# Patient Record
Sex: FEMALE | Race: WHITE | NOT HISPANIC OR LATINO | Employment: UNEMPLOYED | URBAN - METROPOLITAN AREA
[De-identification: names, ages, dates, MRNs, and addresses within clinical notes are randomized per-mention and may not be internally consistent; named-entity substitution may affect disease eponyms.]

---

## 2017-01-05 ENCOUNTER — LAB VISIT (OUTPATIENT)
Dept: LAB | Facility: HOSPITAL | Age: 42
End: 2017-01-05
Attending: INTERNAL MEDICINE
Payer: OTHER GOVERNMENT

## 2017-01-05 DIAGNOSIS — Z84.0 FAMILY HISTORY OF PSORIATIC ARTHRITIS: ICD-10-CM

## 2017-01-05 DIAGNOSIS — M25.40 JOINT SWELLING: ICD-10-CM

## 2017-01-05 DIAGNOSIS — M25.50 JOINT PAIN: ICD-10-CM

## 2017-01-05 DIAGNOSIS — E72.11 HYPERHOMOCYSTEINEMIA: ICD-10-CM

## 2017-01-05 LAB
ALBUMIN SERPL BCP-MCNC: 4.2 G/DL
ALP SERPL-CCNC: 47 U/L
ALT SERPL W/O P-5'-P-CCNC: 10 U/L
ANION GAP SERPL CALC-SCNC: 6 MMOL/L
AST SERPL-CCNC: 16 U/L
BASOPHILS # BLD AUTO: 0.02 K/UL
BASOPHILS NFR BLD: 0.3 %
BILIRUB SERPL-MCNC: 0.3 MG/DL
BUN SERPL-MCNC: 9 MG/DL
CALCIUM SERPL-MCNC: 9.3 MG/DL
CHLORIDE SERPL-SCNC: 107 MMOL/L
CO2 SERPL-SCNC: 25 MMOL/L
CREAT SERPL-MCNC: 0.9 MG/DL
CRP SERPL-MCNC: 0.5 MG/L
DIFFERENTIAL METHOD: NORMAL
EOSINOPHIL # BLD AUTO: 0.1 K/UL
EOSINOPHIL NFR BLD: 2 %
ERYTHROCYTE [DISTWIDTH] IN BLOOD BY AUTOMATED COUNT: 12.1 %
ERYTHROCYTE [SEDIMENTATION RATE] IN BLOOD BY WESTERGREN METHOD: 6 MM/HR
EST. GFR  (AFRICAN AMERICAN): >60 ML/MIN/1.73 M^2
EST. GFR  (NON AFRICAN AMERICAN): >60 ML/MIN/1.73 M^2
GLUCOSE SERPL-MCNC: 134 MG/DL
HCT VFR BLD AUTO: 38.2 %
HGB BLD-MCNC: 13 G/DL
LYMPHOCYTES # BLD AUTO: 2 K/UL
LYMPHOCYTES NFR BLD: 30.8 %
MCH RBC QN AUTO: 30.6 PG
MCHC RBC AUTO-ENTMCNC: 34 %
MCV RBC AUTO: 90 FL
MONOCYTES # BLD AUTO: 0.4 K/UL
MONOCYTES NFR BLD: 6.1 %
NEUTROPHILS # BLD AUTO: 3.9 K/UL
NEUTROPHILS NFR BLD: 60.8 %
PLATELET # BLD AUTO: 161 K/UL
PMV BLD AUTO: 10.3 FL
POTASSIUM SERPL-SCNC: 3.7 MMOL/L
PROT SERPL-MCNC: 6.8 G/DL
RBC # BLD AUTO: 4.25 M/UL
SODIUM SERPL-SCNC: 138 MMOL/L
WBC # BLD AUTO: 6.42 K/UL

## 2017-01-05 PROCEDURE — 36415 COLL VENOUS BLD VENIPUNCTURE: CPT

## 2017-01-05 PROCEDURE — 85025 COMPLETE CBC W/AUTO DIFF WBC: CPT

## 2017-01-05 PROCEDURE — 83090 ASSAY OF HOMOCYSTEINE: CPT

## 2017-01-05 PROCEDURE — 80053 COMPREHEN METABOLIC PANEL: CPT

## 2017-01-05 PROCEDURE — 86140 C-REACTIVE PROTEIN: CPT

## 2017-01-05 PROCEDURE — 85651 RBC SED RATE NONAUTOMATED: CPT

## 2017-01-06 LAB — HCYS SERPL-SCNC: 7.8 UMOL/L

## 2017-01-09 ENCOUNTER — OFFICE VISIT (OUTPATIENT)
Dept: UROLOGY | Facility: CLINIC | Age: 42
End: 2017-01-09
Payer: OTHER GOVERNMENT

## 2017-01-09 ENCOUNTER — HOSPITAL ENCOUNTER (OUTPATIENT)
Dept: RADIOLOGY | Facility: HOSPITAL | Age: 42
Discharge: HOME | End: 2017-01-09
Attending: UROLOGY
Payer: OTHER GOVERNMENT

## 2017-01-09 VITALS
RESPIRATION RATE: 16 BRPM | BODY MASS INDEX: 24.29 KG/M2 | HEART RATE: 70 BPM | SYSTOLIC BLOOD PRESSURE: 116 MMHG | DIASTOLIC BLOOD PRESSURE: 76 MMHG | HEIGHT: 62 IN | WEIGHT: 132 LBS

## 2017-01-09 DIAGNOSIS — N28.89 RENAL MASS: ICD-10-CM

## 2017-01-09 DIAGNOSIS — L95.8 URTICARIAL VASCULITIS: ICD-10-CM

## 2017-01-09 DIAGNOSIS — N30.01 ACUTE CYSTITIS WITH HEMATURIA: Primary | ICD-10-CM

## 2017-01-09 PROCEDURE — 25500020 PHARM REV CODE 255: Performed by: UROLOGY

## 2017-01-09 PROCEDURE — 99213 OFFICE O/P EST LOW 20 MIN: CPT | Mod: S$PBB,,, | Performed by: UROLOGY

## 2017-01-09 PROCEDURE — 74178 CT ABD&PLV WO CNTR FLWD CNTR: CPT | Mod: 26,,, | Performed by: RADIOLOGY

## 2017-01-09 PROCEDURE — 63600175 PHARM REV CODE 636 W HCPCS: Performed by: STUDENT IN AN ORGANIZED HEALTH CARE EDUCATION/TRAINING PROGRAM

## 2017-01-09 PROCEDURE — 99999 PR PBB SHADOW E&M-EST. PATIENT-LVL III: CPT | Mod: PBBFAC,,, | Performed by: UROLOGY

## 2017-01-09 PROCEDURE — 99213 OFFICE O/P EST LOW 20 MIN: CPT | Mod: PBBFAC | Performed by: UROLOGY

## 2017-01-09 PROCEDURE — 74178 CT ABD&PLV WO CNTR FLWD CNTR: CPT | Mod: TC

## 2017-01-09 RX ORDER — ZINC GLUCONATE 13.3 MG
LOZENGE ORAL
Qty: 180 TABLET | Refills: 1 | Status: SHIPPED | OUTPATIENT
Start: 2017-01-09 | End: 2017-01-10 | Stop reason: SDUPTHER

## 2017-01-09 RX ORDER — MELOXICAM 7.5 MG/1
7.5 TABLET ORAL DAILY PRN
Qty: 90 TABLET | Refills: 1 | Status: SHIPPED | OUTPATIENT
Start: 2017-01-09 | End: 2017-02-09 | Stop reason: SDUPTHER

## 2017-01-09 RX ORDER — DIPHENHYDRAMINE HYDROCHLORIDE 50 MG/ML
50 INJECTION INTRAMUSCULAR; INTRAVENOUS ONCE
Status: COMPLETED | OUTPATIENT
Start: 2017-01-09 | End: 2017-01-09

## 2017-01-09 RX ADMIN — IOHEXOL 75 ML: 350 INJECTION, SOLUTION INTRAVENOUS at 01:01

## 2017-01-09 RX ADMIN — DIPHENHYDRAMINE HYDROCHLORIDE 50 MG: 50 INJECTION INTRAMUSCULAR; INTRAVENOUS at 12:01

## 2017-01-09 NOTE — PATIENT INSTRUCTIONS
"  Bladder Infection, Female (Adult)    Urine is normally free from bacteria. But bacteria can get into the urinary tract from the skin around the rectum, or it can travel in the blood from elsewhere in the body. Once it is in your urinary tract, it can cause infection in the urethra (urethritis), the bladder (cystitis), or the kidneys (pyelonephritis).  The most common place for an infection is in the bladder. This is called a bladder infection. This is one of the most common infections in women. Most bladder infections are easily treated. They are not serious unless the infection spreads up to the kidney.  The phrases "bladder infection", "UTI," and "cystitis," are often used to describe the same thing, but they are not always the same. Cystitis is an inflammation of the bladder. The most common cause of cystitis is an infection.  Symptoms  The infection causes inflammation in the urethra and bladder, which causes many of the symptoms. The most common symptoms of a bladder infection are:  · Pain or burning when urinating  · Having to urinate more often than usual  · Urgent need to urinate  · Only a small amount of urine comes out  · Blood in urine  · Abdominal discomfort, usually in the lower abdomen, above the pubic bone  · Cloudy, strong, or bad smelling urine  · Urinary retention, being unable to urinate  · Unable to hold urine in (urinary incontinence)  · Fever  · Loss of appetite  · Confusion (in older adults)  Causes  Bladder infections are not contagious. You can't get one from someone else, from a toilet seat, or from sharing a bath.  The most common cause of bladder infections is bacteria from the bowels. The bacteria get onto the skin around the opening of the urethra. From there, it can get into the urine and travel up to the bladder, causing inflammation and infection. This usually happens because of:  · Wiping improperly after urinating--always wipe from front to back.  · Bowel " incontinence  · Pregnancy  · Procedures such as having a catheter inserted  · Older age  · Not emptying your bladder (stagnated urine gives bacteria a chance to grow)  · Dehydration  · Constipation  · Sex  · Use of a diaphragm for birth control   Treatment  Bladder infections are diagnosed by a urine test. They are treated with antibiotics and usually clear up quickly without complications. Treatment helps prevent a more serious kidney infection.  Medicines  Medicines can help in the treatment of a bladder infection:  · Take antibiotics until they are used up, even if you feel better. It is important to finish them to make sure the infection has cleared.  · You can use acetaminophen or ibuprofen for pain, fever, or discomfort, unless another medicine was prescribed. You can also alternate them, or use both together. They work differently and are a different class of medicines, so taking them together is not an overdose. If you have chronic liver or kidney disease, talk with your healthcare provider before using these medicines. Also talk with your provider if you've ever had a stomach ulcer or gastrointestinal bleeding, or are taking blood-thinner medicines.  · If you are given phenazopydridine to reduce burning with urination, it will cause your urine to become a bright orange color. This can stain clothing.  Care and prevention  These self-care steps can help prevent future infections:  · Drink plenty of fluids to prevent dehydration and flush out of the bladder. Do this unless you must restrict fluids for other health reasons, or your doctor told you not to.  · Proper cleaning after going to the bathroom is important. Wipe from front to back after using the toilet to prevent the spread of bacteria.  · Urinate more often. Don't try to hold urine in for a long time.  · Wear loose-fitting clothes and cotton underwear. Avoid tight-fitting pans.  · Improve your diet and prevent constipation. Eat more fresh fruit and  vegetables, and fiber, and less junk and fatty foods.  · Avoid sex until your symptoms are gone.  · Avoid caffeine, alcohol, and spicy foods. These can irritate the bladder.  · Urinate right after intercourse to flush out the bladder.  · If you use birth control pills and have frequent bladder infections, discuss it with your doctor.  Follow-up care  Call your healthcare provider if all symptoms are not gone after 3 days of treatment. This is especially important if you have repeat infections.  If a culture was done, you will be told if your treatment needs to be changed. If directed, you can call to find out the results.  If X-rays were done, you will be told if the results will affect your treatment.  Call 911  Call emergency services if any of the following occur:  · Trouble breathing  · Difficulty arousing or confusion  · Fainting or loss of consciousness  · Rapid heart rate  When to seek medical advice  Call your healthcare provider right away if any of these occur:  · Fever of 100.4ºF (38.0ºC) or higher, or as directed  · Symptoms are not better by the third day of treatment  · Back or belly (abdominal) pain that gets worse  · Repeated vomiting, or unable to keep medicine down  · Weakness or dizziness  · Vaginal discharge  · Pain, redness, or swelling in the outer vaginal area (labia)  © 8058-7100 The . [address] [place] [address], [place] [address]. All rights reserved. This information is not intended as a substitute for professional medical care. Always follow your healthcare professional's instructions.

## 2017-01-09 NOTE — PROGRESS NOTES
Administered Emergent Allergy Prep as ordered, post procedure, patient will be discharge accompanied  by spouse.

## 2017-01-09 NOTE — PROGRESS NOTES
Subjective:       Patient ID: Evon Leary is a 41 y.o. female.    Chief Complaint: Renal mass (CT results)    HPI: Evon Leary is a 41 y.o. White female who returns today in follow-up for a possible right renal mass.    The mass was found incidentally on non-contrast CT scan done for abdominal pain.    The patient did have gross hematuria, however it was likely she was having a viral or bacterial illness.    Her urine culture was positive for an E. coli UTI. She had been placed on keflex, however she is still having some constitutional symptoms, including fatigue and chills.    The patient's uncle had kidney cancer, but he is still alive. She does not know the details of his kidney cancer. The patient denies a personal history of kidney cancer.    The patient returns today to review interval imaging.    She has had no urologic complaints since last being seen.    Review of patient's allergies indicates:   Allergen Reactions    Wheat containing prod Itching    Iodine and iodide containing products Hives    Ciprofloxacin     Hydroxychloroquine Other (See Comments)     Hyperpigmentation forehead       Current Outpatient Prescriptions   Medication Sig Dispense Refill    cetirizine (ZYRTEC) 10 MG tablet Take 1 tablet (10 mg total) by mouth once daily. 90 tablet 3    cholecalciferol, vitamin D3, 2,000 unit Cap Take 1 capsule by mouth once daily.      cimetidine (TAGAMET) 200 MG tablet TAKE 1 TABLET (200 MG TOTAL) BY MOUTH 2 (TWO) TIMES DAILY. 180 tablet 1    coenzyme Q10 400 mg Cap Take 400 mg by mouth.      COLCRYS 0.6 mg tablet TAKE 1 TABLET TWICE A  tablet 0    cyanocobalamin (VITAMIN B-12) 1000 MCG tablet Take 1,000 mcg by mouth once daily.      folic acid (FOLVITE) 1 MG tablet Take 2 tablets (2 mg total) by mouth once daily. 180 tablet 0    meloxicam (MOBIC) 7.5 MG tablet Take 1 tablet (7.5 mg total) by mouth daily as needed for Pain. 90 tablet 1    RETIN-A 0.05 % cream APPLY  THIN FILM TO FACE AT BEDTIME 45 g 3    fluocinolone-hydroq.-tretinoin 0.01-4-0.05 % Crea Apply 1 application topically every evening. To dark spots ONLY. Wash off qam and apply sunscreen. 30 g 2    ivabradine (CORLANOR) 5 mg Tab Take 1 tablet (5 mg total) by mouth 2 (two) times daily. 180 tablet 3     No current facility-administered medications for this visit.        Past Medical History   Diagnosis Date    Allergy     Anxiety     Dysphagia     Eye injury as a child      hit in os     Hives     Joint pain     Strabismus     Urticarial vasculitis     Vasculitis     Vomiting        Past Surgical History   Procedure Laterality Date    Tubal ligation         Family History   Problem Relation Age of Onset    Arthritis Mother      psoriatic arthritis    Cancer Mother      colon ca    Melanoma Mother     Cataracts Mother     Thyroid disease Mother     Glaucoma Mother     Macular degeneration Mother     Autoimmune disease Son      eosinophilic    Arthritis Maternal Uncle      JRA/RA    Rheum arthritis Maternal Uncle     Cataracts Maternal Grandmother     Glaucoma Maternal Grandmother     Macular degeneration Maternal Grandmother     Shaji-Danlos syndrome Son     Shaji-Danlos syndrome Son     No Known Problems Father     No Known Problems Sister     No Known Problems Brother     No Known Problems Maternal Aunt     No Known Problems Paternal Aunt     No Known Problems Paternal Uncle     No Known Problems Maternal Grandfather     No Known Problems Paternal Grandmother     No Known Problems Paternal Grandfather     Amblyopia Neg Hx     Blindness Neg Hx     Diabetes Neg Hx     Hypertension Neg Hx     Retinal detachment Neg Hx     Strabismus Neg Hx     Stroke Neg Hx        Review of Systems    Review of Systems   Constitutional: Negative for fever, chills, activity change, appetite change and unexpected weight change.   HENT: Negative for congestion, nosebleeds, sneezing, sore throat  and trouble swallowing.    Eyes: Negative for pain, discharge, redness and visual disturbance.   Respiratory: Negative for cough, choking, chest tightness and shortness of breath.    Cardiovascular: Negative for chest pain, palpitations and leg swelling.   Gastrointestinal: Negative for nausea, vomiting, abdominal pain, diarrhea, blood in stool, abdominal distention and anal bleeding.  Genitourinary: As documented per HPI   Endocrine: Negative for cold intolerance, heat intolerance, polydipsia, polyphagia and polyuria.   Musculoskeletal: Negative for myalgias, gait problem, neck pain and neck stiffness.   Skin: Negative for color change, pallor, rash and wound.   Allergic/Immunologic: Negative for immunocompromised state.   Neurological: Negative for seizures, facial asymmetry, speech difficulty, weakness and light-headedness.   Hematological: Negative for adenopathy. Does not bruise/bleed easily.   Psychiatric/Behavioral: Negative for hallucinations, behavioral problems, self-injury and agitation. The patient is not hyperactive.      All other systems were reviewed and were negative.    Objective:     Vitals:    01/09/17 1417   BP: 116/76   Pulse: 70   Resp: 16     Physical Exam   Vitals reviewed.  Constitutional: She is oriented to person, place, and time. She appears well-developed and well-nourished. No distress.   HENT:   Head: Normocephalic and atraumatic.   Right Ear: External ear normal.   Left Ear: External ear normal.   Nose: Nose normal.   Eyes: EOM are normal. Pupils are equal, round, and reactive to light. Right eye exhibits no discharge. Left eye exhibits no discharge.   Neck: Normal range of motion. Neck supple. No tracheal deviation present. No thyroid enlargement or tenderness.  Cardiovascular: Normal heart sounds and intact distal pulses. No signs of peripheral edema.   Pulmonary/Chest: Effort normal and breath sounds normal. No respiratory distress. She has no wheezes.   Abdominal: Soft. Bowel  sounds are normal. She exhibits no distension. There is no tenderness. There is no rebound. No hepatic or splenic enlargement or tenderness.  Musculoskeletal: Normal range of motion. She exhibits no edema.   Neurological: She is alert and oriented to person, place, and time. Coordination normal.   Skin: Skin is warm and dry. She is not diaphoretic.   Lymphatic: No palpable lymph nodes.  Psychiatric: She has a normal mood and affect. Her behavior is normal. Judgment and thought content normal.     Lab Results   Component Value Date    CREATININE 0.9 01/05/2017     Lab Results   Component Value Date    EGFRNONAA >60 01/05/2017     Lab Results   Component Value Date    ESTGFRAFRICA >60 01/05/2017     I personally reviewed all the patient's imaging studies.    Non-contrast CT scan of the abdomen/pelvis (9/25/16): Slightly enlarged, hypodense appearance of the right kidney with perinephric stranding without evidence of hydronephrosis.  Findings are suggestive of pyelonephritis, although evaluation for this entity is limited without intravenous contrast.  Renal vein thrombosis and renal infarct could have a similar appearance.  If patient has risk factors for either of these entities and no clinical signs of infection, contrast enhanced CT should be considered. 7 mm rounded hyperdense lesion in the interpolar region of the right kidney most likely reflects a hyperdense cyst but solid mass cannot be excluded.    CT scan abdomen without/with contrast (10/4/16): Heterogeneous attenuation and enhancement at the upper pole of the right kidney more suggestive of pyelonephritis rather than a solid mass, followup CT exam without and with contrast in more than 6 weeks after completion of treatment highly recommended to insure resolution.    CT scan abdomen/pelvis without/with contrast (1/9/17): Normal renal contrast enhancement is noted bilaterally, and the previously reported heterogeneous attenuation of the right kidney appears  to have resolved. No solid renal masses are detected.    Assessment:       1. Acute cystitis with hematuria    2. Renal mass        Plan:     Evon was seen today for renal mass.    Diagnoses and all orders for this visit:    Acute cystitis with hematuria    Renal mass    The patient's follow-up imaging shows no evidence of a renal abnormality.    She can follow-up with us as needed.    I answered all her questions.    I encouraged her and/or any of her family members to call and/or email me with questions/concerns.    I spent 15 minutes with the patient of which more than half was spent in coordinating the patient's care as well as in direct consultation with the patient in regards to our treatment and plan.

## 2017-01-10 RX ORDER — CIMETIDINE 400 MG/1
200 TABLET, FILM COATED ORAL 2 TIMES DAILY
Qty: 45 TABLET | Refills: 1 | Status: SHIPPED | OUTPATIENT
Start: 2017-01-10 | End: 2017-01-11 | Stop reason: SDUPTHER

## 2017-01-11 RX ORDER — CIMETIDINE 400 MG/1
200 TABLET, FILM COATED ORAL 2 TIMES DAILY
Qty: 45 TABLET | Refills: 1 | Status: CANCELLED | OUTPATIENT
Start: 2017-01-11

## 2017-01-11 RX ORDER — CIMETIDINE 400 MG/1
200 TABLET, FILM COATED ORAL 2 TIMES DAILY
Qty: 45 TABLET | Refills: 1 | Status: SHIPPED | OUTPATIENT
Start: 2017-01-11 | End: 2017-07-07 | Stop reason: SDUPTHER

## 2017-01-30 ENCOUNTER — TELEPHONE (OUTPATIENT)
Dept: DERMATOLOGY | Facility: CLINIC | Age: 42
End: 2017-01-30

## 2017-01-30 NOTE — TELEPHONE ENCOUNTER
----- Message from Adelaide Rhoadesmaurisio sent at 1/30/2017 10:48 AM CST -----  Contact: pt  lorin pt-Pt is calling in ref to a rash on her abd.  Pt states that Lorin wanted to know when it came back to do a biopsy.  Pt can be reached at [phone]

## 2017-02-01 ENCOUNTER — OFFICE VISIT (OUTPATIENT)
Dept: DERMATOLOGY | Facility: CLINIC | Age: 42
End: 2017-02-01
Payer: OTHER GOVERNMENT

## 2017-02-01 DIAGNOSIS — L98.9 DISEASE OF SKIN AND SUBCUTANEOUS TISSUE: Primary | ICD-10-CM

## 2017-02-01 PROCEDURE — 11100 PR BIOPSY OF SKIN LESION: CPT | Mod: PBBFAC | Performed by: DERMATOLOGY

## 2017-02-01 PROCEDURE — 99499 UNLISTED E&M SERVICE: CPT | Mod: S$PBB,,, | Performed by: DERMATOLOGY

## 2017-02-01 PROCEDURE — 88305 TISSUE EXAM BY PATHOLOGIST: CPT | Performed by: PATHOLOGY

## 2017-02-01 PROCEDURE — 99999 PR PBB SHADOW E&M-EST. PATIENT-LVL II: CPT | Mod: PBBFAC,,, | Performed by: DERMATOLOGY

## 2017-02-01 PROCEDURE — 11100 PR BIOPSY OF SKIN LESION: CPT | Mod: S$PBB,,, | Performed by: DERMATOLOGY

## 2017-02-01 PROCEDURE — 99212 OFFICE O/P EST SF 10 MIN: CPT | Mod: PBBFAC | Performed by: DERMATOLOGY

## 2017-02-01 PROCEDURE — 88312 SPECIAL STAINS GROUP 1: CPT | Mod: 26,,, | Performed by: PATHOLOGY

## 2017-02-01 NOTE — PROGRESS NOTES
Subjective:       Patient ID:  Evon Leary is a 41 y.o. female who presents for   Chief Complaint   Patient presents with    Rash     Abdomen, x 1 1/2 weeks, no treatment, itching and buring     Rash  - Initial  Affected locations: abdomen  Duration: 1 1/2 weeks.  Signs / symptoms: burning and itching  Aggravated by: nothing  Relieving factors/Treatments tried: nothing    At her last visit, pt had mentioned this rash on her abdomen which comes and stays for weeks to months at a time and then leaves with bruise-like discoloration. It itches and burns. It was not present at her last visit, but it is here today (although fading). States it has been recurring x 3 yrs overall. At first she thought wheat was a trigger, but she doesn't think she consumed any wheat prior to this last outbreak. Would like a biopsy done today.    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 Chung for chronic urticaria. She also c/o getting itchy hives in areas of pressure and after taking hot showers.  Has a hx of Raynaud's as well. Used tri-magda which helped with the discoloration on her face from plaquenil.    Review of Systems   Constitutional: Positive for night sweats. Negative for fever, chills, weight loss, weight gain, fatigue and malaise.   Skin: Positive for rash, daily sunscreen use and activity-related sunscreen use. Negative for recent sunburn.   Hematologic/Lymphatic: Bruises/bleeds easily.        Objective:    Physical Exam   Constitutional: She appears well-developed and well-nourished. No distress.   Neurological: She is alert and oriented to person, place, and time. She is not disoriented.   Psychiatric: She has a normal mood and affect.   Skin:   Areas Examined (abnormalities noted in diagram):   Head / Face Inspection Performed  Neck Inspection Performed  Abdomen Inspection Performed              Diagram Legend     Erythematous scaling macule/papule c/w  actinic keratosis       Vascular papule c/w angioma      Pigmented verrucoid papule/plaque c/w seborrheic keratosis      Yellow umbilicated papule c/w sebaceous hyperplasia      Irregularly shaped tan macule c/w lentigo     1-2 mm smooth white papules consistent with Milia      Movable subcutaneous cyst with punctum c/w epidermal inclusion cyst      Subcutaneous movable cyst c/w pilar cyst      Firm pink to brown papule c/w dermatofibroma      Pedunculated fleshy papule(s) c/w skin tag(s)      Evenly pigmented macule c/w junctional nevus     Mildly variegated pigmented, slightly irregular-bordered macule c/w mildly atypical nevus      Flesh colored to evenly pigmented papule c/w intradermal nevus       Pink pearly papule/plaque c/w basal cell carcinoma      Erythematous hyperkeratotic cursted plaque c/w SCC      Surgical scar with no sign of skin cancer recurrence      Open and closed comedones      Inflammatory papules and pustules      Verrucoid papule consistent consistent with wart     Erythematous eczematous patches and plaques     Dystrophic onycholytic nail with subungual debris c/w onychomycosis     Umbilicated papule    Erythematous-base heme-crusted tan verrucoid plaque consistent with inflamed seborrheic keratosis     Erythematous Silvery Scaling Plaque c/w Psoriasis     See annotation      Assessment / Plan:      Disease of skin and subcutaneous tissue  Punch biopsy procedure note:  Punch biopsy performed after verbal consent obtained. Area marked and prepped with alcohol. Approximately 1cc of 1% lidocaine with epinephrine injected. 4 mm disposable punch used to remove lesion. Hemostasis obtained and biopsy site closed with 1 - 2 Prolene sutures. Wound care instructions reviewed with patient and handout given.    -     Tissue Specimen To Pathology, Dermatology    Pathology Orders:      Normal Orders This Visit    Tissue Specimen To Pathology, Dermatology     Questions:    Directional Terms:   Other(comment)    Clinical information:  r/o dermatitis herpetiformis vs. eczema vs. contact dermatitis vs. other Comment - punch    Specific Site:  R lower abdomen        rtc 2 wks for suture removal and biopsy results

## 2017-02-01 NOTE — MR AVS SNAPSHOT
[facility]  [address] [facility] [address]  Phone: [phone]  Fax: [phone]                  Evon Leary   2017 11:00 AM   Office Visit    Description:  Female : 1975   Provider:  Kiki Hannah MD   Department:  [facility] - Dermatology           Reason for Visit     Rash                To Do List           Future Appointments        Provider Department Dept Phone    2/15/2017 2:00 PM [facility] NURSE, DERM [facility] [phone]    2017 10:20 AM LAB, [facility] [phone]    2017 2:30 PM Tamir Donato MD [facility] [phone]      Goals (5 Years of Data)     None      [facility] On [facility]     [facility] Line -  Assistance  Registered nurses in the [facility] provide clinical advisement, health education, appointment booking, and other advisory services.  Call for this free service at [phone].             Medications           Message regarding Medications     Verify the changes and/or additions to your medication regime listed below are the same as discussed with your clinician today.  If any of these changes or additions are incorrect, please notify your healthcare provider.             Verify that the below list of medications is an accurate representation of the medications you are currently taking.  If none reported, the list may be blank. If incorrect, please contact your healthcare provider. Carry this list with you in case of emergency.           Current Medications     cetirizine (ZYRTEC) 10 MG tablet Take 1 tablet (10 mg total) by mouth once daily.    cholecalciferol, vitamin D3, 2,000 unit Cap Take 1 capsule by mouth once daily.    cimetidine (TAGAMET) 400 MG tablet Take 0.5 tablets (200 mg total) by mouth 2 (two) times daily.    coenzyme Q10 400 mg Cap Take 400 mg by mouth.    COLCRYS 0.6 mg tablet TAKE 1 TABLET TWICE A DAY    cyanocobalamin  (VITAMIN B-12) 1000 MCG tablet Take 1,000 mcg by mouth once daily.    fluocinolone-hydroq.-tretinoin 0.01-4-0.05 % Crea Apply 1 application topically every evening. To dark spots ONLY. Wash off qam and apply sunscreen.    folic acid (FOLVITE) 1 MG tablet Take 2 tablets (2 mg total) by mouth once daily.    ivabradine (CORLANOR) 5 mg Tab Take 1 tablet (5 mg total) by mouth 2 (two) times daily.    meloxicam (MOBIC) 7.5 MG tablet Take 1 tablet (7.5 mg total) by mouth daily as needed for Pain.    RETIN-A 0.05 % cream APPLY THIN FILM TO FACE AT BEDTIME           Clinical Reference Information           Allergies as of 2/1/2017     Wheat Containing Prod    Iodine And Iodide Containing Products    Ciprofloxacin    Hydroxychloroquine      Immunizations Administered on Date of Encounter - 2/1/2017     None      Smoking Cessation     If you would like to quit smoking:   You may be eligible for free services if you are a [place] resident and started smoking cigarettes before September 1, 1988.  Call the [employer]oking [employer]essation [employer] ([employer]) toll free at [phone] or [phone].   Call 3-489-QUIT-NOW if you do not meet the above criteria.

## 2017-02-05 NOTE — PATIENT INSTRUCTIONS
Punch Biopsy Wound Care    Your doctor has performed a punch biopsy today.  A band aid and antibiotic ointment has been placed over the site.  This should remain in place for 24 hours.  It is recommended that you keep the area dry for the first 24 hours.  After 24 hours, you may remove the band aid and wash the area with warm soap and water and apply Vaseline jelly.  Many patients prefer to use Neosporin or Bacitracin ointment.  This is acceptable; however know that you can develop an allergy to this medication even if you have used it safely for years.  It is important to keep the area moist.  Letting it dry out and get air slows healing time, will worsen the scar, and make it more difficult to remove the stitches if they were placed.  Band aid is optional after first 24 hours.      If you notice increasing redness, tenderness, pain, or yellow drainage at the biopsy or surgical site, please notify your doctor.  These are signs of an infection.    If your biopsy/surgical site is bleeding, apply firm pressure for 15 minutes straight.  Repeat for another 15 minutes, if it is still bleeding.   If the surgical site continues to bleed, then please contact your doctor.      [address] [place] [address]/ [phone] FAX/ [email or web address]

## 2017-02-07 ENCOUNTER — PATIENT MESSAGE (OUTPATIENT)
Dept: RHEUMATOLOGY | Facility: CLINIC | Age: 42
End: 2017-02-07

## 2017-02-09 DIAGNOSIS — L95.8 URTICARIAL VASCULITIS: ICD-10-CM

## 2017-02-09 RX ORDER — MELOXICAM 7.5 MG/1
7.5 TABLET ORAL DAILY PRN
Qty: 90 TABLET | Refills: 1 | Status: SHIPPED | OUTPATIENT
Start: 2017-02-09 | End: 2017-04-20 | Stop reason: SDUPTHER

## 2017-02-13 DIAGNOSIS — L95.8 URTICARIAL VASCULITIS: ICD-10-CM

## 2017-02-13 RX ORDER — COLCHICINE 0.6 MG/1
TABLET, FILM COATED ORAL
Qty: 180 TABLET | Refills: 0 | Status: SHIPPED | OUTPATIENT
Start: 2017-02-13 | End: 2017-05-12 | Stop reason: SDUPTHER

## 2017-02-15 ENCOUNTER — CLINICAL SUPPORT (OUTPATIENT)
Dept: DERMATOLOGY | Facility: CLINIC | Age: 42
End: 2017-02-15
Payer: OTHER GOVERNMENT

## 2017-02-15 PROCEDURE — 99999 PR PBB SHADOW E&M-EST. PATIENT-LVL II: CPT | Mod: PBBFAC,,,

## 2017-02-15 PROCEDURE — 99212 OFFICE O/P EST SF 10 MIN: CPT | Mod: PBBFAC

## 2017-02-15 PROCEDURE — 99024 POSTOP FOLLOW-UP VISIT: CPT | Mod: ,,, | Performed by: DERMATOLOGY

## 2017-02-15 NOTE — PROGRESS NOTES
Suture Removal note:  CC: 41 y.o. female patient is here for suture removal.         HPI: Patient is s/p punch of SUBACUTE SPONGIOTIC DERMATITIS from the Right Lower Abdomen on 2/1/2017.  Patient reports no problems.    WOUND PE:  Sutures intact.  Wound healing well.  Good approximation of skin edges.  No signs or symptoms of infection.    IMPRESSION:      1) Right lower abdomen punch.  FINAL PATHOLOGIC DIAGNOSIS  1. Skin, right lower abdomen, punch biopsy:  - SUBACUTE SPONGIOTIC DERMATITIS (See Comment).  MICROSCOPIC DESCRIPTION: The biopsy shows parakeratosis, epidermal acanthosis with elongation of rete  ridges, mild focal spongiosis with exocytosis of lymphocytes and a superficial perivascular dermal lymphohistiocytic  infiltrate with numerous interstitial eosinophils. PAS stain is negative for fungi. Appropriately reactive controls were  reviewed. Multiple levels were examined.  COMMENT: Differential diagnosis includes allergic contact dermatitis , nummular dermatitis or id reaction. A drug  eruption cannot be excluded. Clinical correlation is advised.  Diagnosed by: Hazel Ta M.D.    PLAN:  Sutures removed today.  Continue wound care.    RTC: In PRN months.

## 2017-02-21 DIAGNOSIS — R74.01 TRANSAMINITIS: ICD-10-CM

## 2017-02-21 DIAGNOSIS — N10 ACUTE PYELONEPHRITIS: ICD-10-CM

## 2017-02-21 DIAGNOSIS — E53.8 FOLATE DEFICIENCY: ICD-10-CM

## 2017-02-21 DIAGNOSIS — L95.8 URTICARIAL VASCULITIS: ICD-10-CM

## 2017-02-21 DIAGNOSIS — R79.89 ELEVATED HOMOCYSTEINE: ICD-10-CM

## 2017-02-21 DIAGNOSIS — D69.6 THROMBOCYTOPENIA: ICD-10-CM

## 2017-02-21 RX ORDER — FOLIC ACID 1 MG/1
TABLET ORAL
Qty: 180 TABLET | Refills: 1 | Status: SHIPPED | OUTPATIENT
Start: 2017-02-21 | End: 2017-08-20 | Stop reason: SDUPTHER

## 2017-04-18 ENCOUNTER — LAB VISIT (OUTPATIENT)
Dept: LAB | Facility: HOSPITAL | Age: 42
End: 2017-04-18
Attending: INTERNAL MEDICINE
Payer: OTHER GOVERNMENT

## 2017-04-18 DIAGNOSIS — Z84.0 FAMILY HISTORY OF PSORIATIC ARTHRITIS: ICD-10-CM

## 2017-04-18 DIAGNOSIS — M25.50 JOINT PAIN: ICD-10-CM

## 2017-04-18 DIAGNOSIS — M25.40 JOINT SWELLING: ICD-10-CM

## 2017-04-18 LAB
ALBUMIN SERPL BCP-MCNC: 4 G/DL
ALP SERPL-CCNC: 43 U/L
ALT SERPL W/O P-5'-P-CCNC: 6 U/L
ANION GAP SERPL CALC-SCNC: 6 MMOL/L
AST SERPL-CCNC: 16 U/L
BASOPHILS # BLD AUTO: 0.03 K/UL
BASOPHILS NFR BLD: 0.5 %
BILIRUB SERPL-MCNC: 0.3 MG/DL
BUN SERPL-MCNC: 12 MG/DL
CALCIUM SERPL-MCNC: 9.6 MG/DL
CHLORIDE SERPL-SCNC: 106 MMOL/L
CO2 SERPL-SCNC: 27 MMOL/L
CREAT SERPL-MCNC: 0.9 MG/DL
CRP SERPL-MCNC: 0.5 MG/L
DIFFERENTIAL METHOD: NORMAL
EOSINOPHIL # BLD AUTO: 0.2 K/UL
EOSINOPHIL NFR BLD: 3.6 %
ERYTHROCYTE [DISTWIDTH] IN BLOOD BY AUTOMATED COUNT: 12.8 %
ERYTHROCYTE [SEDIMENTATION RATE] IN BLOOD BY WESTERGREN METHOD: 7 MM/HR
EST. GFR  (AFRICAN AMERICAN): >60 ML/MIN/1.73 M^2
EST. GFR  (NON AFRICAN AMERICAN): >60 ML/MIN/1.73 M^2
GLUCOSE SERPL-MCNC: 90 MG/DL
HCT VFR BLD AUTO: 37.5 %
HGB BLD-MCNC: 12.7 G/DL
LYMPHOCYTES # BLD AUTO: 2.6 K/UL
LYMPHOCYTES NFR BLD: 41.5 %
MCH RBC QN AUTO: 31 PG
MCHC RBC AUTO-ENTMCNC: 33.9 %
MCV RBC AUTO: 92 FL
MONOCYTES # BLD AUTO: 0.5 K/UL
MONOCYTES NFR BLD: 7.8 %
NEUTROPHILS # BLD AUTO: 2.9 K/UL
NEUTROPHILS NFR BLD: 46.4 %
PLATELET # BLD AUTO: 160 K/UL
PMV BLD AUTO: 10.8 FL
POTASSIUM SERPL-SCNC: 4 MMOL/L
PROT SERPL-MCNC: 7 G/DL
RBC # BLD AUTO: 4.1 M/UL
SODIUM SERPL-SCNC: 139 MMOL/L
WBC # BLD AUTO: 6.17 K/UL

## 2017-04-18 PROCEDURE — 85651 RBC SED RATE NONAUTOMATED: CPT

## 2017-04-18 PROCEDURE — 85025 COMPLETE CBC W/AUTO DIFF WBC: CPT

## 2017-04-18 PROCEDURE — 36415 COLL VENOUS BLD VENIPUNCTURE: CPT

## 2017-04-18 PROCEDURE — 80053 COMPREHEN METABOLIC PANEL: CPT

## 2017-04-18 PROCEDURE — 86140 C-REACTIVE PROTEIN: CPT

## 2017-04-20 ENCOUNTER — OFFICE VISIT (OUTPATIENT)
Dept: RHEUMATOLOGY | Facility: CLINIC | Age: 42
End: 2017-04-20
Payer: OTHER GOVERNMENT

## 2017-04-20 ENCOUNTER — HOSPITAL ENCOUNTER (OUTPATIENT)
Dept: RADIOLOGY | Facility: HOSPITAL | Age: 42
Discharge: HOME | End: 2017-04-20
Attending: STUDENT IN AN ORGANIZED HEALTH CARE EDUCATION/TRAINING PROGRAM
Payer: OTHER GOVERNMENT

## 2017-04-20 VITALS
HEIGHT: 64 IN | HEART RATE: 108 BPM | WEIGHT: 134 LBS | SYSTOLIC BLOOD PRESSURE: 121 MMHG | BODY MASS INDEX: 22.88 KG/M2 | DIASTOLIC BLOOD PRESSURE: 80 MMHG

## 2017-04-20 DIAGNOSIS — I95.1 AUTONOMIC ORTHOSTATIC HYPOTENSION: ICD-10-CM

## 2017-04-20 DIAGNOSIS — M19.90 OSTEOARTHRITIS, UNSPECIFIED OSTEOARTHRITIS TYPE, UNSPECIFIED SITE: ICD-10-CM

## 2017-04-20 DIAGNOSIS — M35.7 BENIGN FAMILIAL HYPERMOBILITY: ICD-10-CM

## 2017-04-20 DIAGNOSIS — K58.8 OTHER IRRITABLE BOWEL SYNDROME: ICD-10-CM

## 2017-04-20 DIAGNOSIS — M19.90 OSTEOARTHRITIS, UNSPECIFIED OSTEOARTHRITIS TYPE, UNSPECIFIED SITE: Primary | ICD-10-CM

## 2017-04-20 DIAGNOSIS — L95.8 URTICARIAL VASCULITIS: ICD-10-CM

## 2017-04-20 DIAGNOSIS — G90.9 AUTONOMIC DYSFUNCTION: ICD-10-CM

## 2017-04-20 DIAGNOSIS — M79.7 FIBROMYALGIA: ICD-10-CM

## 2017-04-20 PROCEDURE — 99214 OFFICE O/P EST MOD 30 MIN: CPT | Mod: S$PBB,,, | Performed by: INTERNAL MEDICINE

## 2017-04-20 PROCEDURE — 73130 X-RAY EXAM OF HAND: CPT | Mod: 50,TC

## 2017-04-20 PROCEDURE — 99213 OFFICE O/P EST LOW 20 MIN: CPT | Mod: PBBFAC | Performed by: INTERNAL MEDICINE

## 2017-04-20 PROCEDURE — 73130 X-RAY EXAM OF HAND: CPT | Mod: 26,50,, | Performed by: RADIOLOGY

## 2017-04-20 PROCEDURE — 99999 PR PBB SHADOW E&M-EST. PATIENT-LVL III: CPT | Mod: PBBFAC,,, | Performed by: INTERNAL MEDICINE

## 2017-04-20 RX ORDER — MELOXICAM 7.5 MG/1
7.5 TABLET ORAL 2 TIMES DAILY PRN
Qty: 180 TABLET | Refills: 1 | Status: SHIPPED | OUTPATIENT
Start: 2017-04-20 | End: 2017-04-20 | Stop reason: SDUPTHER

## 2017-04-20 RX ORDER — MELOXICAM 7.5 MG/1
7.5 TABLET ORAL 2 TIMES DAILY PRN
Qty: 180 TABLET | Refills: 1 | Status: SHIPPED | OUTPATIENT
Start: 2017-04-20 | End: 2017-07-19

## 2017-04-20 RX ORDER — GLUCOSAMINE/CHONDRO SU A 500-400 MG
1 TABLET ORAL 3 TIMES DAILY
COMMUNITY

## 2017-04-20 ASSESSMENT — ROUTINE ASSESSMENT OF PATIENT INDEX DATA (RAPID3)
PAIN SCORE: 7.5
MDHAQ FUNCTION SCORE: 1.1
AM STIFFNESS SCORE: 1, YES
PSYCHOLOGICAL DISTRESS SCORE: 4.4
FATIGUE SCORE: 7.5
WHEN YOU AWAKENED IN THE MORNING OVER THE LAST WEEK, PLEASE INDICATE THE AMOUNT OF TIME IT TAKES UNTIL YOU ARE AS LIMBER AS YOU WILL BE FOR THE DAY: 2 HRS
PATIENT GLOBAL ASSESSMENT SCORE: 7
TOTAL RAPID3 SCORE: 6.05

## 2017-04-20 NOTE — PROGRESS NOTES
"Subjective:       Patient ID: Evon Leary is a 41 y.o. female.    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 At the last visit, she was advised to take a medrol dose pack for her urticarial vasculitis.    She has gotten a skin biopsy which showed allergic contact dermatitis. Further testing was warranted, but her insurance did cover the patch testing.    Today, she reports that she has been experiencing 2 hours of morning stiffness and complains of significant pain and swelling in her hands and feet. States that in addition to joints her bilateral heels are also hurting her. Continues to complain of hives and vasculitis intermittently. Also complains of significant wrist pain for 3 weeks after doing yoga. States it felt like her wrist was out of place and had to wear a wrist brace and do exercises.    Sees team of doctors in [place] for her hypermobility syndrome, that includes a .    Review of Systems   Constitutional: Negative for fever and unexpected weight change.   HENT: Negative for mouth sores and trouble swallowing.         Abnormal Hair Loss   Eyes: Negative for redness.   Respiratory: Negative for cough and shortness of breath.    Cardiovascular: Negative for chest pain.   Gastrointestinal: Negative for constipation and diarrhea.   Genitourinary: Negative for dysuria and genital sores.   Skin: Positive for color change and rash.   Neurological: Negative for headaches.        Burning/tingling   Hematological: Negative for adenopathy. Does not bruise/bleed easily.   Psychiatric/Behavioral: Negative for agitation.         Objective:   /80  Pulse 108  Ht 5' 3.6" (1.615 m)  Wt 60.8 kg (134 lb)  BMI 23.29 kg/m2     Physical Exam   Constitutional: She is oriented to person, place, and time and well-developed, well-nourished, and in no distress.   HENT:   Head: Normocephalic " and atraumatic.   Eyes: EOM are normal.   Neck: Normal range of motion.       Cardiovascular: Normal rate, regular rhythm, normal heart sounds and intact distal pulses.    Pulmonary/Chest: Effort normal and breath sounds normal.       Right Side Rheumatological Exam     Examination finds the shoulder, elbow, wrist, knee, 1st PIP, 1st MCP, 2nd PIP, 2nd MCP, 3rd PIP, 3rd MCP, 4th PIP, 4th MCP, 5th PIP and 5th MCP normal.    The patient is tender to palpation of the 2nd DIP, 5th DIP and 1st MTP    The patient has an enlarged 2nd DIP, 3rd DIP, 4th DIP, 5th DIP and 1st MTP    Shoulder Exam   Sensation: normal    Knee Exam   Sensation: normal    Hip Exam   Sensation: normal    Elbow/Wrist Exam   Sensation: normal    Muscle Strength (0-5 scale):  Deltoid:  5  Biceps: 5/5   Triceps:  5  : 5/5   Iliopsoas: 5  Quadriceps:  5   Distal Lower Extremity: 5    Left Side Rheumatological Exam     Examination finds the shoulder, elbow, wrist, knee, 1st PIP, 1st MCP, 2nd PIP, 2nd MCP, 3rd PIP, 3rd MCP, 4th PIP, 4th MCP, 5th PIP and 5th MCP normal.    The patient is tender to palpation of the 2nd DIP, 3rd DIP, 5th DIP and 1st MTP.    The patient has an enlarged 2nd DIP, 3rd DIP, 4th DIP and 1st MTP.    Shoulder Exam   Sensation: normal    Knee Exam   Sensation: normal    Hip Exam   Sensation: normal    Elbow/Wrist Exam   Sensation: normal    Muscle Strength (0-5 scale):  Deltoid:  5  Biceps: 5/5   Triceps:  5  :  5/5   Iliopsoas: 5  Quadriceps:  5   Distal Lower Extremity: 5      Neurological: She is alert and oriented to person, place, and time. No cranial nerve deficit.   Right Owens's Sign:  absent  Left Owens's Sign:  Absent  Reflex Scores:       Tricep reflexes are 2+ on the right side and 2+ on the left side.       Bicep reflexes are 2+ on the right side and 2+ on the left side.       Brachioradialis reflexes are 2+ on the right side and 2+ on the left side.       Patellar reflexes are 2+ on the right side and 2+ on  the left side.       Achilles reflexes are 2+ on the right side and 2+ on the left side.  Skin: Skin is warm and dry. No rash noted.     Remnants of rash in lower abdominal/pelvic region  Ganglion cyst noted on volar aspect of left wrist   Psychiatric: Affect normal.   Musculoskeletal:   Increased ROM throughout           Results for ANKIT IVERSON (MRN [id number]) as of 4/20/2017 09:40   Ref. Range 1/5/2017 16:40   WBC Latest Ref Range: 3.90 - 12.70 K/uL 6.42   RBC Latest Ref Range: 4.00 - 5.40 M/uL 4.25   Hemoglobin Latest Ref Range: 12.0 - 16.0 g/dL 13.0   Hematocrit Latest Ref Range: 37.0 - 48.5 % 38.2   MCV Latest Ref Range: 82 - 98 fL 90   MCH Latest Ref Range: 27.0 - 31.0 pg 30.6   MCHC Latest Ref Range: 32.0 - 36.0 % 34.0   RDW Latest Ref Range: 11.5 - 14.5 % 12.1   Platelets Latest Ref Range: 150 - 350 K/uL 161   MPV Latest Ref Range: 9.2 - 12.9 fL 10.3   Gran% Latest Ref Range: 38.0 - 73.0 % 60.8   Gran # Latest Ref Range: 1.8 - 7.7 K/uL 3.9   Lymph% Latest Ref Range: 18.0 - 48.0 % 30.8   Lymph # Latest Ref Range: 1.0 - 4.8 K/uL 2.0   Mono% Latest Ref Range: 4.0 - 15.0 % 6.1   Mono # Latest Ref Range: 0.3 - 1.0 K/uL 0.4   Eosinophil% Latest Ref Range: 0.0 - 8.0 % 2.0   Eos # Latest Ref Range: 0.0 - 0.5 K/uL 0.1   Basophil% Latest Ref Range: 0.0 - 1.9 % 0.3   Baso # Latest Ref Range: 0.00 - 0.20 K/uL 0.02   Sed Rate Latest Ref Range: 0 - 20 mm/Hr 6   Sodium Latest Ref Range: 136 - 145 mmol/L 138   Potassium Latest Ref Range: 3.5 - 5.1 mmol/L 3.7   Chloride Latest Ref Range: 95 - 110 mmol/L 107   CO2 Latest Ref Range: 23 - 29 mmol/L 25   Anion Gap Latest Ref Range: 8 - 16 mmol/L 6 (L)   BUN, Bld Latest Ref Range: 6 - 20 mg/dL 9   Creatinine Latest Ref Range: 0.5 - 1.4 mg/dL 0.9   eGFR if non African American Latest Ref Range: >60 mL/min/1.73 m^2 >60   eGFR if  Latest Ref Range: >60 mL/min/1.73 m^2 >60   Glucose Latest Ref Range: 70 - 110 mg/dL 134 (H)   Calcium Latest Ref Range: 8.7 -  10.5 mg/dL 9.3   Alkaline Phosphatase Latest Ref Range: 55 - 135 U/L 47 (L)   Total Protein Latest Ref Range: 6.0 - 8.4 g/dL 6.8   Albumin Latest Ref Range: 3.5 - 5.2 g/dL 4.2   Total Bilirubin Latest Ref Range: 0.1 - 1.0 mg/dL 0.3   AST Latest Ref Range: 10 - 40 U/L 16   ALT Latest Ref Range: 10 - 44 U/L 10   CRP Latest Ref Range: 0.0 - 8.2 mg/L 0.5   Homocysteine Latest Ref Range: 4.0 - 15.5 umol/L 7.8   Differential Method Unknown Automated     Results for ANKIT IVERSON (MRN [id number]) as of 4/20/2017 09:40   Ref. Range 4/18/2017 18:17 4/18/2017 18:18   WBC Latest Ref Range: 3.90 - 12.70 K/uL  6.17   RBC Latest Ref Range: 4.00 - 5.40 M/uL  4.10   Hemoglobin Latest Ref Range: 12.0 - 16.0 g/dL  12.7   Hematocrit Latest Ref Range: 37.0 - 48.5 %  37.5   MCV Latest Ref Range: 82 - 98 fL  92   MCH Latest Ref Range: 27.0 - 31.0 pg  31.0   MCHC Latest Ref Range: 32.0 - 36.0 %  33.9   RDW Latest Ref Range: 11.5 - 14.5 %  12.8   Platelets Latest Ref Range: 150 - 350 K/uL  160   MPV Latest Ref Range: 9.2 - 12.9 fL  10.8   Gran% Latest Ref Range: 38.0 - 73.0 %  46.4   Gran # Latest Ref Range: 1.8 - 7.7 K/uL  2.9   Lymph% Latest Ref Range: 18.0 - 48.0 %  41.5   Lymph # Latest Ref Range: 1.0 - 4.8 K/uL  2.6   Mono% Latest Ref Range: 4.0 - 15.0 %  7.8   Mono # Latest Ref Range: 0.3 - 1.0 K/uL  0.5   Eosinophil% Latest Ref Range: 0.0 - 8.0 %  3.6   Eos # Latest Ref Range: 0.0 - 0.5 K/uL  0.2   Basophil% Latest Ref Range: 0.0 - 1.9 %  0.5   Baso # Latest Ref Range: 0.00 - 0.20 K/uL  0.03   Sed Rate Latest Ref Range: 0 - 20 mm/Hr 7    Sodium Latest Ref Range: 136 - 145 mmol/L  139   Potassium Latest Ref Range: 3.5 - 5.1 mmol/L  4.0   Chloride Latest Ref Range: 95 - 110 mmol/L  106   CO2 Latest Ref Range: 23 - 29 mmol/L  27   Anion Gap Latest Ref Range: 8 - 16 mmol/L  6 (L)   BUN, Bld Latest Ref Range: 6 - 20 mg/dL  12   Creatinine Latest Ref Range: 0.5 - 1.4 mg/dL  0.9   eGFR if non African American Latest Ref Range: >60  mL/min/1.73 m^2  >60   eGFR if  Latest Ref Range: >60 mL/min/1.73 m^2  >60   Glucose Latest Ref Range: 70 - 110 mg/dL  90   Calcium Latest Ref Range: 8.7 - 10.5 mg/dL  9.6   Alkaline Phosphatase Latest Ref Range: 55 - 135 U/L  43 (L)   Total Protein Latest Ref Range: 6.0 - 8.4 g/dL  7.0   Albumin Latest Ref Range: 3.5 - 5.2 g/dL  4.0   Total Bilirubin Latest Ref Range: 0.1 - 1.0 mg/dL  0.3   AST Latest Ref Range: 10 - 40 U/L  16   ALT Latest Ref Range: 10 - 44 U/L  6 (L)   CRP Latest Ref Range: 0.0 - 8.2 mg/L  0.5   Differential Method Unknown  Automated       Surgical Pathology 2/1/17  FINAL PATHOLOGIC DIAGNOSIS  1. Skin, right lower abdomen, punch biopsy:  - SUBACUTE SPONGIOTIC DERMATITIS (See Comment).    Assessment:       1. Urticarial vasculitis    2. s/p pyelonephritis    3. Transaminitis    4. Thrombocytopenia    5. Elevated homocysteine    6. Folate deficiency        Plan:       Gave patient abstract of paper on a multisystem disorder associated with increased TPSAB1 copy number associated with elevated basal serum tryptase  Patient with two negative tryptases in the past which were not during episodes of rash, would like to get a serum tryptase during acute rash flare, have placed a standing order for serum tryptase, patient will contact the office and get lab during flare  Increase meloxicam to 7.5 mg twice daily prn  Continue colchicine 0.6 mg  Recommended glucosamine-chondroitin sulfate for suspected osteoarthritis  X-rays of bilateral hands/wrists to evaluate for osteoarthritis and other changes  RTC in 3 months

## 2017-04-20 NOTE — PROGRESS NOTES
I have personally taken the history and examined the patient and agree with the resident,s note as stated above. Still daily urticaria, but rash not as extensive. Intermittent right lower quad-left lower quadrant rash: spongiotic derm.    Exam: + 7/9 Beighton criteria. Chest is clear. Skin clear at present. Tiny Heberden's node right index. Ganglion cyst left radio-scaphoid space        FINAL PATHOLOGIC DIAGNOSIS  1. Skin, right lower abdomen, punch biopsy:  - SUBACUTE SPONGIOTIC DERMATITIS (See Comment).  MICROSCOPIC DESCRIPTION: The biopsy shows parakeratosis, epidermal acanthosis with elongation of rete  ridges, mild focal spongiosis with exocytosis of lymphocytes and a superficial perivascular dermal lymphohistiocytic  infiltrate with numerous interstitial eosinophils. PAS stain is negative for fungi. Appropriately reactive controls were  reviewed. Multiple levels were examined.  COMMENT: Differential diagnosis includes allergic contact dermatitis , nummular dermatitis or id reaction. A drug  eruption cannot be excluded. Clinical correlation is advised.  Diagnosed by: Hazel Ta M.D.  (Electronically Signed: 2017-02-07 15:46:      Results for ANKIT IVERSON (MRN [id number]) as of 4/20/2017 09:48   Ref. Range 4/18/2017 18:17 4/18/2017 18:18   WBC Latest Ref Range: 3.90 - 12.70 K/uL  6.17   RBC Latest Ref Range: 4.00 - 5.40 M/uL  4.10   Hemoglobin Latest Ref Range: 12.0 - 16.0 g/dL  12.7   Hematocrit Latest Ref Range: 37.0 - 48.5 %  37.5   MCV Latest Ref Range: 82 - 98 fL  92   MCH Latest Ref Range: 27.0 - 31.0 pg  31.0   MCHC Latest Ref Range: 32.0 - 36.0 %  33.9   RDW Latest Ref Range: 11.5 - 14.5 %  12.8   Platelets Latest Ref Range: 150 - 350 K/uL  160   MPV Latest Ref Range: 9.2 - 12.9 fL  10.8   Gran% Latest Ref Range: 38.0 - 73.0 %  46.4   Gran # Latest Ref Range: 1.8 - 7.7 K/uL  2.9   Lymph% Latest Ref Range: 18.0 - 48.0 %  41.5   Lymph # Latest Ref Range: 1.0 - 4.8 K/uL  2.6   Mono% Latest Ref  Range: 4.0 - 15.0 %  7.8   Mono # Latest Ref Range: 0.3 - 1.0 K/uL  0.5   Eosinophil% Latest Ref Range: 0.0 - 8.0 %  3.6   Eos # Latest Ref Range: 0.0 - 0.5 K/uL  0.2   Basophil% Latest Ref Range: 0.0 - 1.9 %  0.5   Baso # Latest Ref Range: 0.00 - 0.20 K/uL  0.03   Sed Rate Latest Ref Range: 0 - 20 mm/Hr 7    Sodium Latest Ref Range: 136 - 145 mmol/L  139   Potassium Latest Ref Range: 3.5 - 5.1 mmol/L  4.0   Chloride Latest Ref Range: 95 - 110 mmol/L  106   CO2 Latest Ref Range: 23 - 29 mmol/L  27   Anion Gap Latest Ref Range: 8 - 16 mmol/L  6 (L)   BUN, Bld Latest Ref Range: 6 - 20 mg/dL  12   Creatinine Latest Ref Range: 0.5 - 1.4 mg/dL  0.9   eGFR if non African American Latest Ref Range: >60 mL/min/1.73 m^2  >60   eGFR if  Latest Ref Range: >60 mL/min/1.73 m^2  >60   Glucose Latest Ref Range: 70 - 110 mg/dL  90   Calcium Latest Ref Range: 8.7 - 10.5 mg/dL  9.6   Alkaline Phosphatase Latest Ref Range: 55 - 135 U/L  43 (L)   Total Protein Latest Ref Range: 6.0 - 8.4 g/dL  7.0   Albumin Latest Ref Range: 3.5 - 5.2 g/dL  4.0   Total Bilirubin Latest Ref Range: 0.1 - 1.0 mg/dL  0.3   AST Latest Ref Range: 10 - 40 U/L  16   ALT Latest Ref Range: 10 - 44 U/L  6 (L)   CRP Latest Ref Range: 0.0 - 8.2 mg/L  0.5   Differential Method Unknown  Automated   Results for ANKIT IVERSON (MRN [id number]) as of 4/20/2017 09:48   Ref. Range 10/22/2014 14:00 2/4/2015 15:50 7/9/2015 12:28 8/26/2015 11:40 11/18/2015 12:00   Anti-SSA Antibody Latest Ref Range: 0.00 - 19.99 EU 3.33    1.97   Anti-SSA Interpretation Latest Ref Range: Negative  Negative    Negative   Anti-SSB Antibody Latest Ref Range: 0.00 - 19.99 EU     0.89   Anti-SSB Interpretation Latest Ref Range: Negative      Negative   ds DNA Ab Latest Ref Range: Negative 1:10  Negative 1:10       Cytoplasmic Neutrophilic Ab Latest Ref Range: <1:20 Titer     <1:20   Perinuclear (P-ANCA) Latest Ref Range: <1:20 Titer     <1:20   ANCA Proteinase 3 Latest Ref  Range: <0.4 (Negative) U     <0.2   MPO Latest Ref Range: <=20 UNITS     2   Complement (C-3) Latest Ref Range: 50 - 180 mg/dL 77  79     Complement (C-4) Latest Ref Range: 11 - 44 mg/dL 21  22     Protein, Serum Latest Ref Range: 6.0 - 8.4 g/dL  7.3      Albumin grams/dl Latest Ref Range: 3.35 - 5.55 g/dL  4.18      Alpha-1 grams/dl Latest Ref Range: 0.17 - 0.41 g/dL  0.36      Alpha-2 grams/dl Latest Ref Range: 0.43 - 0.99 g/dL  0.80      Beta grams/dl Latest Ref Range: 0.50 - 1.10 g/dL  0.77      Gamma grams/dl Latest Ref Range: 0.67 - 1.58 g/dL  1.19      CCP Antibodies Latest Ref Range: <5.0 U/mL    <0.5    Rheumatoid Factor Latest Ref Range: 0.0 - 15.0 IU/mL    <10.0    Results for ANKIT IVERSON (MRN [id number]) as of 4/20/2017 09:48   Ref. Range 3/17/2014 12:59 12/18/2015 00:08   Tryptase Latest Ref Range: <11.5 ng/mL 3.0 5.4     Results for ANKIT IVERSON (MRN [id number]) as of 4/20/2017 09:48   Ref. Range 3/9/2016 10:59 3/9/2016 10:59 9/12/2016 15:26 1/5/2017 16:40   Homocysteine Latest Ref Range: 4.0 - 15.5 umol/L 81.5 (H) 81.5 (H) 20.6 (H) 7.8          Urticarial vasculitis       off hydroxychloroquine b/o hyperpigmentation as of 8/2015. Continues colchicine [06.mg once daily                  2. Fibromyalgia improved with aerobic exercise                          3. EDS (Shaji-Danlos syndrome) III, benign hypermobility syndrome     4. Low back pain without sciatica, unspecified back pain laterality       5. Uterine fibroids s/p BTL  6. Hyperhomocysteinemia, folate deficiency normal on folic acid 1mg daily  7. Paresthesiae, prior EMG/NCV 2014 with bilateral ulnar neuropathy  8. Intermittent left wrist pain, ganglion cyst  9. Abdominal rash, spongiotic dermatitis on biopsy  10. 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

## 2017-05-12 ENCOUNTER — LAB VISIT (OUTPATIENT)
Dept: LAB | Facility: HOSPITAL | Age: 42
End: 2017-05-12
Attending: INTERNAL MEDICINE
Payer: OTHER GOVERNMENT

## 2017-05-12 ENCOUNTER — PATIENT MESSAGE (OUTPATIENT)
Dept: RHEUMATOLOGY | Facility: CLINIC | Age: 42
End: 2017-05-12

## 2017-05-12 ENCOUNTER — TELEPHONE (OUTPATIENT)
Dept: RHEUMATOLOGY | Facility: CLINIC | Age: 42
End: 2017-05-12

## 2017-05-12 DIAGNOSIS — L95.8 URTICARIAL VASCULITIS: ICD-10-CM

## 2017-05-12 DIAGNOSIS — R21 RASH: ICD-10-CM

## 2017-05-12 DIAGNOSIS — R21 RASH: Primary | ICD-10-CM

## 2017-05-12 PROCEDURE — 36415 COLL VENOUS BLD VENIPUNCTURE: CPT

## 2017-05-12 PROCEDURE — 83520 IMMUNOASSAY QUANT NOS NONAB: CPT

## 2017-05-12 RX ORDER — COLCHICINE 0.6 MG/1
TABLET, FILM COATED ORAL
Qty: 180 TABLET | Refills: 1 | Status: SHIPPED | OUTPATIENT
Start: 2017-05-12

## 2017-05-15 LAB — TRYPTASE LEVEL: 4.9 NG/ML

## 2017-07-07 RX ORDER — CIMETIDINE 400 MG/1
TABLET, FILM COATED ORAL
Qty: 90 TABLET | Refills: 1 | Status: SHIPPED | OUTPATIENT
Start: 2017-07-07

## 2017-07-24 ENCOUNTER — PATIENT MESSAGE (OUTPATIENT)
Dept: ELECTROPHYSIOLOGY | Facility: CLINIC | Age: 42
End: 2017-07-24

## 2017-08-01 ENCOUNTER — PATIENT MESSAGE (OUTPATIENT)
Dept: RHEUMATOLOGY | Facility: CLINIC | Age: 42
End: 2017-08-01

## 2017-08-20 DIAGNOSIS — R79.89 ELEVATED HOMOCYSTEINE: ICD-10-CM

## 2017-08-20 DIAGNOSIS — E53.8 FOLATE DEFICIENCY: ICD-10-CM

## 2017-08-20 DIAGNOSIS — L95.8 URTICARIAL VASCULITIS: ICD-10-CM

## 2017-08-20 DIAGNOSIS — N10 ACUTE PYELONEPHRITIS: ICD-10-CM

## 2017-08-20 DIAGNOSIS — R74.01 TRANSAMINITIS: ICD-10-CM

## 2017-08-20 DIAGNOSIS — D69.6 THROMBOCYTOPENIA: ICD-10-CM

## 2017-08-20 RX ORDER — FOLIC ACID 1 MG/1
TABLET ORAL
Qty: 180 TABLET | Refills: 0 | Status: SHIPPED | OUTPATIENT
Start: 2017-08-20

## 2017-12-27 ENCOUNTER — PATIENT MESSAGE (OUTPATIENT)
Dept: RHEUMATOLOGY | Facility: CLINIC | Age: 42
End: 2017-12-27

## 2018-01-08 ENCOUNTER — PATIENT MESSAGE (OUTPATIENT)
Dept: RHEUMATOLOGY | Facility: CLINIC | Age: 43
End: 2018-01-08

## 2018-01-09 ENCOUNTER — TELEPHONE (OUTPATIENT)
Dept: RHEUMATOLOGY | Facility: CLINIC | Age: 43
End: 2018-01-09

## 2018-01-09 DIAGNOSIS — R90.89 ABNORMAL BRAIN MRI: Primary | ICD-10-CM

## 2018-01-09 NOTE — TELEPHONE ENCOUNTER
Pt messaged MRI brain w/o contrast apparently done [facility]:    Nonspecific focus of increased T2 FLAIR signnal periventricular WM. Otherwise unremarkable. Diff includes small vessel ischemic change, demyelination, infectious/inflammatory process    Please schedule Neurology consult (Vascular Neurology) for abnormal MRI brain. Thanks DANIELLA

## 2018-01-11 ENCOUNTER — TELEPHONE (OUTPATIENT)
Dept: RHEUMATOLOGY | Facility: CLINIC | Age: 43
End: 2018-01-11

## 2018-01-15 ENCOUNTER — PATIENT MESSAGE (OUTPATIENT)
Dept: RHEUMATOLOGY | Facility: CLINIC | Age: 43
End: 2018-01-15

## 2018-01-29 ENCOUNTER — TELEPHONE (OUTPATIENT)
Dept: OPHTHALMOLOGY | Facility: CLINIC | Age: 43
End: 2018-01-29

## 2018-01-29 NOTE — TELEPHONE ENCOUNTER
----- Message from Ailin Haddad sent at 1/29/2018  1:06 PM CST -----  Contact: pt [phone]  Patient called for her records that was done on Oct 25, 2013 she had pictures done of the back of her eye she is asking if you will going  [facility] [place] [facility]  The  Fax is [phone] patient states she has spoken to out Medical records dept and she was informed that these films would be in your office.

## 2018-01-29 NOTE — TELEPHONE ENCOUNTER
Called pt to inform her that she needs to [facility] [place] in [place] send a sign Release of Information form to the [facility] records dept. They can have those records pulled and sent.